# Patient Record
Sex: FEMALE | Race: BLACK OR AFRICAN AMERICAN | NOT HISPANIC OR LATINO | URBAN - METROPOLITAN AREA
[De-identification: names, ages, dates, MRNs, and addresses within clinical notes are randomized per-mention and may not be internally consistent; named-entity substitution may affect disease eponyms.]

---

## 2018-04-25 ENCOUNTER — EMERGENCY (EMERGENCY)
Facility: HOSPITAL | Age: 48
LOS: 1 days | Discharge: HOME | End: 2018-04-25
Attending: EMERGENCY MEDICINE | Admitting: EMERGENCY MEDICINE
Payer: MEDICAID

## 2018-04-25 VITALS
SYSTOLIC BLOOD PRESSURE: 132 MMHG | DIASTOLIC BLOOD PRESSURE: 88 MMHG | OXYGEN SATURATION: 98 % | TEMPERATURE: 98 F | RESPIRATION RATE: 16 BRPM | HEART RATE: 95 BPM

## 2018-04-25 VITALS
DIASTOLIC BLOOD PRESSURE: 88 MMHG | RESPIRATION RATE: 16 BRPM | OXYGEN SATURATION: 98 % | HEART RATE: 87 BPM | SYSTOLIC BLOOD PRESSURE: 140 MMHG

## 2018-04-25 LAB
ALBUMIN SERPL ELPH-MCNC: 3.5 G/DL — SIGNIFICANT CHANGE UP (ref 3.3–5)
ALP SERPL-CCNC: 141 U/L — HIGH (ref 40–120)
ALT FLD-CCNC: 23 U/L — SIGNIFICANT CHANGE UP (ref 4–33)
APTT BLD: 25 SEC — LOW (ref 27.5–37.4)
AST SERPL-CCNC: 23 U/L — SIGNIFICANT CHANGE UP (ref 4–32)
BASOPHILS # BLD AUTO: 0.02 K/UL — SIGNIFICANT CHANGE UP (ref 0–0.2)
BASOPHILS NFR BLD AUTO: 0.3 % — SIGNIFICANT CHANGE UP (ref 0–2)
BILIRUB SERPL-MCNC: 0.3 MG/DL — SIGNIFICANT CHANGE UP (ref 0.2–1.2)
BUN SERPL-MCNC: 12 MG/DL — SIGNIFICANT CHANGE UP (ref 7–23)
CALCIUM SERPL-MCNC: 9.7 MG/DL — SIGNIFICANT CHANGE UP (ref 8.4–10.5)
CHLORIDE SERPL-SCNC: 100 MMOL/L — SIGNIFICANT CHANGE UP (ref 98–107)
CK MB BLD-MCNC: 1 NG/ML — SIGNIFICANT CHANGE UP (ref 1–4.7)
CK MB BLD-MCNC: SIGNIFICANT CHANGE UP (ref 0–2.5)
CK SERPL-CCNC: 51 U/L — SIGNIFICANT CHANGE UP (ref 25–170)
CO2 SERPL-SCNC: 17 MMOL/L — LOW (ref 22–31)
CREAT SERPL-MCNC: 1.1 MG/DL — SIGNIFICANT CHANGE UP (ref 0.5–1.3)
EOSINOPHIL # BLD AUTO: 0.1 K/UL — SIGNIFICANT CHANGE UP (ref 0–0.5)
EOSINOPHIL NFR BLD AUTO: 1.6 % — SIGNIFICANT CHANGE UP (ref 0–6)
GLUCOSE SERPL-MCNC: 98 MG/DL — SIGNIFICANT CHANGE UP (ref 70–99)
HCG SERPL-ACNC: 5.59 MIU/ML — SIGNIFICANT CHANGE UP
HCT VFR BLD CALC: 30.3 % — LOW (ref 34.5–45)
HGB BLD-MCNC: 9.7 G/DL — LOW (ref 11.5–15.5)
IMM GRANULOCYTES # BLD AUTO: 0.06 # — SIGNIFICANT CHANGE UP
IMM GRANULOCYTES NFR BLD AUTO: 1 % — SIGNIFICANT CHANGE UP (ref 0–1.5)
INR BLD: 1.12 — SIGNIFICANT CHANGE UP (ref 0.88–1.17)
LYMPHOCYTES # BLD AUTO: 1.68 K/UL — SIGNIFICANT CHANGE UP (ref 1–3.3)
LYMPHOCYTES # BLD AUTO: 27 % — SIGNIFICANT CHANGE UP (ref 13–44)
MCHC RBC-ENTMCNC: 29 PG — SIGNIFICANT CHANGE UP (ref 27–34)
MCHC RBC-ENTMCNC: 32 % — SIGNIFICANT CHANGE UP (ref 32–36)
MCV RBC AUTO: 90.4 FL — SIGNIFICANT CHANGE UP (ref 80–100)
MONOCYTES # BLD AUTO: 0.54 K/UL — SIGNIFICANT CHANGE UP (ref 0–0.9)
MONOCYTES NFR BLD AUTO: 8.7 % — SIGNIFICANT CHANGE UP (ref 2–14)
NEUTROPHILS # BLD AUTO: 3.82 K/UL — SIGNIFICANT CHANGE UP (ref 1.8–7.4)
NEUTROPHILS NFR BLD AUTO: 61.4 % — SIGNIFICANT CHANGE UP (ref 43–77)
NRBC # FLD: 0 — SIGNIFICANT CHANGE UP
PLATELET # BLD AUTO: 212 K/UL — SIGNIFICANT CHANGE UP (ref 150–400)
PMV BLD: 9.5 FL — SIGNIFICANT CHANGE UP (ref 7–13)
POTASSIUM SERPL-MCNC: 4.1 MMOL/L — SIGNIFICANT CHANGE UP (ref 3.5–5.3)
POTASSIUM SERPL-SCNC: 4.1 MMOL/L — SIGNIFICANT CHANGE UP (ref 3.5–5.3)
PROT SERPL-MCNC: 7.8 G/DL — SIGNIFICANT CHANGE UP (ref 6–8.3)
PROTHROM AB SERPL-ACNC: 12.5 SEC — SIGNIFICANT CHANGE UP (ref 9.8–13.1)
RBC # BLD: 3.35 M/UL — LOW (ref 3.8–5.2)
RBC # FLD: 13.9 % — SIGNIFICANT CHANGE UP (ref 10.3–14.5)
SODIUM SERPL-SCNC: 136 MMOL/L — SIGNIFICANT CHANGE UP (ref 135–145)
TROPONIN T SERPL-MCNC: < 0.06 NG/ML — SIGNIFICANT CHANGE UP (ref 0–0.06)
WBC # BLD: 6.22 K/UL — SIGNIFICANT CHANGE UP (ref 3.8–10.5)
WBC # FLD AUTO: 6.22 K/UL — SIGNIFICANT CHANGE UP (ref 3.8–10.5)

## 2018-04-25 PROCEDURE — 99285 EMERGENCY DEPT VISIT HI MDM: CPT

## 2018-04-25 PROCEDURE — 93970 EXTREMITY STUDY: CPT | Mod: 26

## 2018-04-25 RX ORDER — ENOXAPARIN SODIUM 100 MG/ML
70 INJECTION SUBCUTANEOUS ONCE
Qty: 0 | Refills: 0 | Status: COMPLETED | OUTPATIENT
Start: 2018-04-25 | End: 2018-04-25

## 2018-04-25 RX ORDER — ENOXAPARIN SODIUM 100 MG/ML
70 INJECTION SUBCUTANEOUS
Qty: 1 | Refills: 0 | OUTPATIENT
Start: 2018-04-25 | End: 2018-05-24

## 2018-04-25 RX ADMIN — ENOXAPARIN SODIUM 70 MILLIGRAM(S): 100 INJECTION SUBCUTANEOUS at 21:53

## 2018-04-25 NOTE — ED PROVIDER NOTE - CARE PLAN
Principal Discharge DX:	DVT (deep venous thrombosis)  Goal:	continue anticoagulation  Assessment and plan of treatment:	please begin lovenox 70mg subcutaneously twice daily  Secondary Diagnosis:	Clear cell adenocarcinoma of ovary, right

## 2018-04-25 NOTE — ED PROVIDER NOTE - OBJECTIVE STATEMENT
47 yo female with PMH of right clear cell ovarian cancer diagnosed 2017, s/p surgery 2017 and s/p radiation, presents to the ED for concern of worsening RLE swelling and edema, in the setting of a recently diagnosed RLE DVT and known segmental and subsegmental PE's on Savaysa (edaxaban) 60 mg/day. Patient is concerned medication is not alleviating her DVT and that it is getting worse. + GONZALEZ x several months at baseline, unchanged from her baseline. Denies CP, palpitations, SOB at rest, rash, pain with walking.  Patient states she originally presented to [facility] in [facility] on 4/11-4/12, diagnosed with DVT and PE at that time, presented back to [place] that Saturday for worsening RLE edema and was admitted and discharged this past Wednesday. States she was receiving Lovenox injections during her hospital stay and was discharged on edaxaban.

## 2018-04-25 NOTE — ED PROVIDER NOTE - PROGRESS NOTE DETAILS
Spoke to [place] ED in [place] with the patient's verbal consent to inquire about the dimensions of the RLE DVT diagnosed by US there, EM resident spoken with stated the US report in their EMR there does not indicate the dimensions of the RLE DVT, only that it was present.

## 2018-04-25 NOTE — ED ADULT NURSE NOTE - OBJECTIVE STATEMENT
pt received spot 16. pt A+Ox3 with hx of ovarian ca was recently hospitalized for PE and RLE DVT, d/c home on PO anticoagulation. pt c/o worsening RLE pain and swelling. denies cp/sob. reports GONZALEZ that has not improved as well. respirations even and unlabored. NAD noted at this time. labs sent. IVSL in place. pt going to US at this time. will continue to monitor.

## 2018-04-25 NOTE — ED ADULT NURSE REASSESSMENT NOTE - NS ED NURSE REASSESS COMMENT FT1
report received from day RN. pt received a&ox4, in no acute distress. pt has no complaints at this time. denies cp/sob. vss as charted. RLE notably swollen w/ pain to bottom of R foot. awaiting US results. will continue to monitor.

## 2018-04-25 NOTE — ED ADULT TRIAGE NOTE - CHIEF COMPLAINT QUOTE
Pt with hx of Ovarian CA last chemo last month, was dx with PE and hospitalized April 11th released with home meds, swelling and pain returned to RLE today around 15:00  Pt reports associated GONZALEZ for months.  Pt denies chest pain

## 2018-04-25 NOTE — ED PROVIDER NOTE - ATTENDING CONTRIBUTION TO CARE
KADIE Attending Note - Dr. Purvis  49 yo female with PMH of right clear cell ovarian cancer diagnosed 2017, s/p surgery 2017 and s/p radiation, presents to the ED for concern of worsening RLE swelling and edema, in the setting of a recently diagnosed RLE DVT and known segmental and subsegmental PE's on Savaysa (edaxaban) 60 mg/day. Patient is concerned medication is not alleviating her DVT and that it is getting worse  PE: pt is alert and oriented, EYE: perrl, ENT normal, membranes are moist, neck supple. no lymphadenopathy or thyroid enlargement, No JVD.  Chest clear to P&A, Heart- reg rhythm without murmur, rubs or gallops, radial pulses equal bilaterally.  Abd is soft, non-tender, Bowel sounds are active. no mass or organomegaly. : No CVA tenderness. Neuro:  Pt alert and oriented x 3. Perrl    Distal neurosensory is intact. Motor function is 5/5 strength bilaterally.  No focal deficits. Extremities:  2+ pitting edema.  Skin: warm and dry.  Psych: No mood or thought abnormality.  Plan:     Labs, including CBC, CMP, , CXR, EKG, IV Hydration, Plan switch patient to SQ Lovenox which worked in past     Impression: Worsening DVT

## 2018-04-25 NOTE — ED PROVIDER NOTE - MEDICAL DECISION MAKING DETAILS
Pt presents to ED w/o h/o ovarian cancer and recent RLE DVT and PE's diagnosed at [facility] in [place], on edaxaban 60 mg daily, concerned for worsening RLE DVT; Will obtain EKG, labs, repeat RLE US

## 2018-04-25 NOTE — ED PROVIDER NOTE - PHYSICAL EXAMINATION
MSK: RLE swollen compared to LLE, non-pitting edema, RLE tender to light palpation throughout, no collateral veins visualized superficially, 1+ DP and PT pulses b/l, normal capillary refill b/l; sensation grossly intact b/l LE, motor strength 5/5 b/l

## 2018-04-26 RX ORDER — ENOXAPARIN SODIUM 100 MG/ML
70 INJECTION SUBCUTANEOUS
Qty: 1 | Refills: 0 | OUTPATIENT
Start: 2018-04-26 | End: 2018-05-25

## 2018-04-30 PROBLEM — C56.1 MALIGNANT NEOPLASM OF RIGHT OVARY: Chronic | Status: ACTIVE | Noted: 2018-04-25

## 2018-04-30 PROBLEM — Z00.00 ENCOUNTER FOR PREVENTIVE HEALTH EXAMINATION: Status: ACTIVE | Noted: 2018-04-30

## 2018-05-02 ENCOUNTER — OUTPATIENT (OUTPATIENT)
Dept: OUTPATIENT SERVICES | Facility: HOSPITAL | Age: 48
LOS: 1 days | Discharge: HOME | End: 2018-05-02

## 2018-05-02 DIAGNOSIS — I80.9 PHLEBITIS AND THROMBOPHLEBITIS OF UNSPECIFIED SITE: ICD-10-CM

## 2018-05-03 ENCOUNTER — APPOINTMENT (OUTPATIENT)
Dept: HEMATOLOGY ONCOLOGY | Facility: CLINIC | Age: 48
End: 2018-05-03

## 2018-05-03 VITALS
SYSTOLIC BLOOD PRESSURE: 115 MMHG | DIASTOLIC BLOOD PRESSURE: 79 MMHG | RESPIRATION RATE: 16 BRPM | WEIGHT: 166.45 LBS | BODY MASS INDEX: 26.12 KG/M2 | TEMPERATURE: 97.9 F | OXYGEN SATURATION: 100 % | HEART RATE: 94 BPM | HEIGHT: 67.01 IN

## 2018-05-03 DIAGNOSIS — I26.99 OTHER PULMONARY EMBOLISM W/OUT ACUTE COR PULMONALE: ICD-10-CM

## 2018-05-03 DIAGNOSIS — Z87.42 PERSONAL HISTORY OF OTHER DISEASES OF THE FEMALE GENITAL TRACT: ICD-10-CM

## 2018-05-03 DIAGNOSIS — Z80.9 FAMILY HISTORY OF MALIGNANT NEOPLASM, UNSPECIFIED: ICD-10-CM

## 2018-05-03 DIAGNOSIS — C56.1 MALIGNANT NEOPLASM OF RIGHT OVARY: ICD-10-CM

## 2018-05-04 PROBLEM — Z80.9 FAMILY HISTORY OF MALIGNANT NEOPLASM: Status: ACTIVE | Noted: 2018-05-04

## 2018-05-04 PROBLEM — C56.1 MALIGNANT NEOPLASM OF RIGHT OVARY: Status: ACTIVE | Noted: 2018-05-04

## 2018-05-04 PROBLEM — Z87.42 HISTORY OF ENDOMETRIOSIS: Status: RESOLVED | Noted: 2018-05-04 | Resolved: 2018-05-04

## 2018-05-07 DIAGNOSIS — C56.9 MALIGNANT NEOPLASM OF UNSPECIFIED OVARY: ICD-10-CM

## 2018-05-10 RX ORDER — ENOXAPARIN SODIUM 100 MG/ML
80 INJECTION SUBCUTANEOUS
Qty: 60 | Refills: 5 | Status: ACTIVE | COMMUNITY
Start: 2018-05-03 | End: 1900-01-01

## 2018-05-17 ENCOUNTER — APPOINTMENT (OUTPATIENT)
Dept: HEMATOLOGY ONCOLOGY | Facility: CLINIC | Age: 48
End: 2018-05-17

## 2018-05-17 VITALS
OXYGEN SATURATION: 97 % | RESPIRATION RATE: 16 BRPM | TEMPERATURE: 97.9 F | DIASTOLIC BLOOD PRESSURE: 88 MMHG | WEIGHT: 164.67 LBS | HEART RATE: 86 BPM | SYSTOLIC BLOOD PRESSURE: 138 MMHG | BODY MASS INDEX: 25.78 KG/M2

## 2018-05-17 DIAGNOSIS — Z43.2 ENCOUNTER FOR ATTENTION TO ILEOSTOMY: ICD-10-CM

## 2018-05-28 ENCOUNTER — FORM ENCOUNTER (OUTPATIENT)
Age: 48
End: 2018-05-28

## 2018-05-29 ENCOUNTER — APPOINTMENT (OUTPATIENT)
Dept: CT IMAGING | Facility: IMAGING CENTER | Age: 48
End: 2018-05-29
Payer: MEDICAID

## 2018-05-29 ENCOUNTER — OUTPATIENT (OUTPATIENT)
Dept: OUTPATIENT SERVICES | Facility: HOSPITAL | Age: 48
LOS: 1 days | End: 2018-05-29
Payer: MEDICAID

## 2018-05-29 DIAGNOSIS — C56.1 MALIGNANT NEOPLASM OF RIGHT OVARY: ICD-10-CM

## 2018-05-29 PROCEDURE — 71260 CT THORAX DX C+: CPT

## 2018-05-29 PROCEDURE — 74177 CT ABD & PELVIS W/CONTRAST: CPT | Mod: 26

## 2018-05-29 PROCEDURE — 71260 CT THORAX DX C+: CPT | Mod: 26

## 2018-05-29 PROCEDURE — 74177 CT ABD & PELVIS W/CONTRAST: CPT

## 2018-05-31 ENCOUNTER — EMERGENCY (EMERGENCY)
Facility: HOSPITAL | Age: 48
LOS: 1 days | Discharge: HOME | End: 2018-05-31
Attending: EMERGENCY MEDICINE | Admitting: EMERGENCY MEDICINE
Payer: MEDICAID

## 2018-05-31 VITALS
HEART RATE: 92 BPM | TEMPERATURE: 99 F | RESPIRATION RATE: 18 BRPM | SYSTOLIC BLOOD PRESSURE: 116 MMHG | DIASTOLIC BLOOD PRESSURE: 90 MMHG | OXYGEN SATURATION: 100 %

## 2018-05-31 VITALS
DIASTOLIC BLOOD PRESSURE: 81 MMHG | OXYGEN SATURATION: 100 % | HEART RATE: 74 BPM | RESPIRATION RATE: 16 BRPM | SYSTOLIC BLOOD PRESSURE: 111 MMHG | TEMPERATURE: 98 F

## 2018-05-31 LAB
ALBUMIN SERPL ELPH-MCNC: 4.4 G/DL — SIGNIFICANT CHANGE UP (ref 3.3–5)
ALP SERPL-CCNC: 172 U/L — HIGH (ref 40–120)
ALT FLD-CCNC: 16 U/L — SIGNIFICANT CHANGE UP (ref 4–33)
AST SERPL-CCNC: 21 U/L — SIGNIFICANT CHANGE UP (ref 4–32)
BASE EXCESS BLDV CALC-SCNC: 1.4 MMOL/L — SIGNIFICANT CHANGE UP
BASOPHILS # BLD AUTO: 0.03 K/UL — SIGNIFICANT CHANGE UP (ref 0–0.2)
BASOPHILS NFR BLD AUTO: 0.5 % — SIGNIFICANT CHANGE UP (ref 0–2)
BILIRUB SERPL-MCNC: 0.3 MG/DL — SIGNIFICANT CHANGE UP (ref 0.2–1.2)
BLOOD GAS VENOUS - CREATININE: 1.45 MG/DL — HIGH (ref 0.5–1.3)
BUN SERPL-MCNC: 18 MG/DL — SIGNIFICANT CHANGE UP (ref 7–23)
CALCIUM SERPL-MCNC: 10.5 MG/DL — SIGNIFICANT CHANGE UP (ref 8.4–10.5)
CHLORIDE BLDV-SCNC: 102 MMOL/L — SIGNIFICANT CHANGE UP (ref 96–108)
CHLORIDE SERPL-SCNC: 97 MMOL/L — LOW (ref 98–107)
CO2 SERPL-SCNC: 24 MMOL/L — SIGNIFICANT CHANGE UP (ref 22–31)
CREAT SERPL-MCNC: 1.4 MG/DL — HIGH (ref 0.5–1.3)
EOSINOPHIL # BLD AUTO: 0.11 K/UL — SIGNIFICANT CHANGE UP (ref 0–0.5)
EOSINOPHIL NFR BLD AUTO: 2 % — SIGNIFICANT CHANGE UP (ref 0–6)
GAS PNL BLDV: 137 MMOL/L — SIGNIFICANT CHANGE UP (ref 136–146)
GLUCOSE BLDV-MCNC: 99 — SIGNIFICANT CHANGE UP (ref 70–99)
GLUCOSE SERPL-MCNC: 95 MG/DL — SIGNIFICANT CHANGE UP (ref 70–99)
HCO3 BLDV-SCNC: 24 MMOL/L — SIGNIFICANT CHANGE UP (ref 20–27)
HCT VFR BLD CALC: 31.9 % — LOW (ref 34.5–45)
HCT VFR BLDV CALC: 32.6 % — LOW (ref 34.5–45)
HGB BLD-MCNC: 10.3 G/DL — LOW (ref 11.5–15.5)
HGB BLDV-MCNC: 10.5 G/DL — LOW (ref 11.5–15.5)
IMM GRANULOCYTES # BLD AUTO: 0.03 # — SIGNIFICANT CHANGE UP
IMM GRANULOCYTES NFR BLD AUTO: 0.5 % — SIGNIFICANT CHANGE UP (ref 0–1.5)
LACTATE BLDV-MCNC: 1.6 MMOL/L — SIGNIFICANT CHANGE UP (ref 0.5–2)
LYMPHOCYTES # BLD AUTO: 1.73 K/UL — SIGNIFICANT CHANGE UP (ref 1–3.3)
LYMPHOCYTES # BLD AUTO: 30.7 % — SIGNIFICANT CHANGE UP (ref 13–44)
MCHC RBC-ENTMCNC: 28 PG — SIGNIFICANT CHANGE UP (ref 27–34)
MCHC RBC-ENTMCNC: 32.3 % — SIGNIFICANT CHANGE UP (ref 32–36)
MCV RBC AUTO: 86.7 FL — SIGNIFICANT CHANGE UP (ref 80–100)
MONOCYTES # BLD AUTO: 0.56 K/UL — SIGNIFICANT CHANGE UP (ref 0–0.9)
MONOCYTES NFR BLD AUTO: 9.9 % — SIGNIFICANT CHANGE UP (ref 2–14)
NEUTROPHILS # BLD AUTO: 3.17 K/UL — SIGNIFICANT CHANGE UP (ref 1.8–7.4)
NEUTROPHILS NFR BLD AUTO: 56.4 % — SIGNIFICANT CHANGE UP (ref 43–77)
NRBC # FLD: 0 — SIGNIFICANT CHANGE UP
PCO2 BLDV: 43 MMHG — SIGNIFICANT CHANGE UP (ref 41–51)
PH BLDV: 7.39 PH — SIGNIFICANT CHANGE UP (ref 7.32–7.43)
PLATELET # BLD AUTO: 265 K/UL — SIGNIFICANT CHANGE UP (ref 150–400)
PMV BLD: 9.4 FL — SIGNIFICANT CHANGE UP (ref 7–13)
PO2 BLDV: < 24 MMHG — LOW (ref 35–40)
POTASSIUM BLDV-SCNC: 4.4 MMOL/L — SIGNIFICANT CHANGE UP (ref 3.4–4.5)
POTASSIUM SERPL-MCNC: 4.5 MMOL/L — SIGNIFICANT CHANGE UP (ref 3.5–5.3)
POTASSIUM SERPL-SCNC: 4.5 MMOL/L — SIGNIFICANT CHANGE UP (ref 3.5–5.3)
PROT SERPL-MCNC: 8.7 G/DL — HIGH (ref 6–8.3)
RBC # BLD: 3.68 M/UL — LOW (ref 3.8–5.2)
RBC # FLD: 14 % — SIGNIFICANT CHANGE UP (ref 10.3–14.5)
SAO2 % BLDV: 16.5 % — LOW (ref 60–85)
SODIUM SERPL-SCNC: 135 MMOL/L — SIGNIFICANT CHANGE UP (ref 135–145)
WBC # BLD: 5.63 K/UL — SIGNIFICANT CHANGE UP (ref 3.8–10.5)
WBC # FLD AUTO: 5.63 K/UL — SIGNIFICANT CHANGE UP (ref 3.8–10.5)

## 2018-05-31 PROCEDURE — 99285 EMERGENCY DEPT VISIT HI MDM: CPT

## 2018-05-31 RX ORDER — SODIUM CHLORIDE 9 MG/ML
1000 INJECTION INTRAMUSCULAR; INTRAVENOUS; SUBCUTANEOUS ONCE
Qty: 0 | Refills: 0 | Status: COMPLETED | OUTPATIENT
Start: 2018-05-31 | End: 2018-05-31

## 2018-05-31 RX ORDER — ONDANSETRON 8 MG/1
4 TABLET, FILM COATED ORAL ONCE
Qty: 0 | Refills: 0 | Status: COMPLETED | OUTPATIENT
Start: 2018-05-31 | End: 2018-05-31

## 2018-05-31 RX ORDER — HYDROMORPHONE HYDROCHLORIDE 2 MG/ML
1 INJECTION INTRAMUSCULAR; INTRAVENOUS; SUBCUTANEOUS ONCE
Qty: 0 | Refills: 0 | Status: DISCONTINUED | OUTPATIENT
Start: 2018-05-31 | End: 2018-05-31

## 2018-05-31 RX ADMIN — SODIUM CHLORIDE 3000 MILLILITER(S): 9 INJECTION INTRAMUSCULAR; INTRAVENOUS; SUBCUTANEOUS at 17:04

## 2018-05-31 RX ADMIN — HYDROMORPHONE HYDROCHLORIDE 1 MILLIGRAM(S): 2 INJECTION INTRAMUSCULAR; INTRAVENOUS; SUBCUTANEOUS at 17:07

## 2018-05-31 RX ADMIN — SODIUM CHLORIDE 3000 MILLILITER(S): 9 INJECTION INTRAMUSCULAR; INTRAVENOUS; SUBCUTANEOUS at 18:35

## 2018-05-31 RX ADMIN — ONDANSETRON 4 MILLIGRAM(S): 8 TABLET, FILM COATED ORAL at 17:04

## 2018-05-31 RX ADMIN — HYDROMORPHONE HYDROCHLORIDE 1 MILLIGRAM(S): 2 INJECTION INTRAMUSCULAR; INTRAVENOUS; SUBCUTANEOUS at 17:13

## 2018-05-31 NOTE — ED PROVIDER NOTE - PROGRESS NOTE DETAILS
Resident: on reassessment, pain and nausea are well controlled after medication. Patient requests additional fluids, still feels dehydrated.

## 2018-05-31 NOTE — ED PROVIDER NOTE - ATTENDING CONTRIBUTION TO CARE
I performed a face-to-face evaluation of the patient and performed a history and physical examination. I agree with the history and physical examination.    Syd[place]: Metastatic ovary cancer. General weakness, vomiting, abd pain. Appears chronically ill. Check labs. Give pain meds and IVF. Likely admit.

## 2018-05-31 NOTE — ED PROVIDER NOTE - PHYSICAL EXAMINATION
Resident:   Gen: ill appearing, of stated age, in mild distress  Head: NC, AT  ENT: PERRL, MMM  Neck: supple with full ROM   Chest: CTAB, no retractions, rate normal, appears to breathe comfortably  Heart: RRR S1S2, No peripheral edema, bilateral pulses in arms and legs  Abd: Soft mild tenderness to palpation right lower quadrant, ostomy pink, brown liquid stool in bag  Back: No spinal deformity, no CVAT  Ext: Moving all 4 extremities without obvious impairment to ROM, no obvious weakness  Neuro: fluid speech  Psych: No anxiety, depression or pressured speech noted  Skin: no urticaria, no diffuse rash

## 2018-05-31 NOTE — PROVIDER CONTACT NOTE (OTHER) - ASSESSMENT
As per d/c summary pt was discharged at 20:38. I was overhead paged at around 20:42 to room 18 A. I went there met with pt, introduced myself asked her what can I do?. I told patient I have to sofi Medicaid to get a Taxi or an Ambulette, but may have to wait over an Hour.  Pt started to scream at me, telling me to get away from her. Pt said this is a discrimination, as soon as I heard that word I asked Nurse Elena to call the Manager. I removed myself to avoid further screaming. Elena called Kyra RN/ANM. Kyra spoke to pt, she calmed her down. I met with patient, I apologized myself , if I did hurt her feelings . Finally I called Howie, rolled her in w/c to taxi. Kyra paid for her Taxi. Assisted her mother too.  As I was rolling her pt started to cry, apologized to me. As per d/c summary pt was discharged at 20:38. I was overhead paged at around 20:42 to room 18 A. I went there met with pt, introduced myself asked her what can I do?. She asked for Taxi.  I told patient I have to sofi Medicaid to get a Taxi or an Ambulette, but may have to wait over an Hour.  Pt started to scream at me, telling me to get away from her. Pt said this is a discrimination, as soon as I heard that word I asked Nurse Elena to call the Manager. I removed myself to avoid further screaming. Elena called Carlos Alberto RN/ANM. Carlos Alberto spoke to pt, she calmed her down. I met with patient, I apologized myself , if I did hurt her feelings . Finally I called Howie, rolled her in w/c to taxi. aCrlos Alberto paid for her Taxi. Assisted her mother too.  As I was rolling her she started to cry, and apologized to me.

## 2018-05-31 NOTE — ED PROVIDER NOTE - CARE PLAN
Principal Discharge DX:	General weakness  Secondary Diagnosis:	Clear cell adenocarcinoma of ovary, right Principal Discharge DX:	General weakness  Assessment and plan of treatment:	You were seen in the ER for abdominal pain. You must follow up with your oncologist in 24 to 48 hours. Return to the ER for any new or worsening signs/symptoms.  1) Take your home pain medications as needed.  2) You were given a copy of your test results and imaging results. Please show these to your oncologist.  Secondary Diagnosis:	Clear cell adenocarcinoma of ovary, right

## 2018-05-31 NOTE — ED ADULT NURSE NOTE - OBJECTIVE STATEMENT
The patient is a 49 y/o female a&ox4 p/w a c/c of two days of NBNB vomiting, and increased output from illeostomy, as well as lethargy/weakness.  Patient reports  a hx of ovarian CA, last chemo 5/5.  Denies fevers/chills, SOB, CP.  Endorsing RLQ.  On lovenox for RLE DVT and PE.  VSS, patient in nad, 18 gauge PIV placed in right ac.

## 2018-05-31 NOTE — ED PROVIDER NOTE - NS ED ROS FT
Constitutional: no fever, no chills.  Eyes: no visual changes.  ENMT: no sore throat.  CV: no chest pain.  Resp: +cough, no shortness of breath.  GI: +abdominal pain, +nausea, +vomiting.  : no dysuria, no hematuria.  MSK: no back pain, no neck pain.  Skin: no rashes.  Neuro: no headache, no loss of consciousness, no weakness, no numbness, no tingling.

## 2018-05-31 NOTE — ED PROVIDER NOTE - PLAN OF CARE
You were seen in the ER for abdominal pain. You must follow up with your oncologist in 24 to 48 hours. Return to the ER for any new or worsening signs/symptoms.  1) Take your home pain medications as needed.  2) You were given a copy of your test results and imaging results. Please show these to your oncologist.

## 2018-05-31 NOTE — ED PROVIDER NOTE - MEDICAL DECISION MAKING DETAILS
Syd[place]: Metastatic ovary cancer. General weakness, vomiting, abd pain. Appears chronically ill. Check labs. Give pain meds and IVF. Likely admit.

## 2018-05-31 NOTE — ED ADULT TRIAGE NOTE - CHIEF COMPLAINT QUOTE
pt bibems from home, c/o right lower quadrant abdominal pain, weakness, nausea, and vomiting, pt with ovarian ca, last chemo 3 weeks ago, pt on lovenox for known right leg dvt and PE, pt also has ileostomy

## 2018-07-02 ENCOUNTER — OUTPATIENT (OUTPATIENT)
Dept: OUTPATIENT SERVICES | Facility: HOSPITAL | Age: 48
LOS: 1 days | End: 2018-07-02
Payer: MEDICAID

## 2018-07-02 DIAGNOSIS — C56.1 MALIGNANT NEOPLASM OF RIGHT OVARY: ICD-10-CM

## 2018-07-03 ENCOUNTER — RESULT REVIEW (OUTPATIENT)
Age: 48
End: 2018-07-03

## 2018-07-03 PROCEDURE — 88321 CONSLTJ&REPRT SLD PREP ELSWR: CPT
